# Patient Record
Sex: MALE | Race: BLACK OR AFRICAN AMERICAN | NOT HISPANIC OR LATINO | Employment: UNEMPLOYED | URBAN - METROPOLITAN AREA
[De-identification: names, ages, dates, MRNs, and addresses within clinical notes are randomized per-mention and may not be internally consistent; named-entity substitution may affect disease eponyms.]

---

## 2023-07-18 ENCOUNTER — HOSPITAL ENCOUNTER (EMERGENCY)
Facility: CLINIC | Age: 33
Discharge: HOME | End: 2023-07-18
Attending: EMERGENCY MEDICINE | Admitting: EMERGENCY MEDICINE
Payer: COMMERCIAL

## 2023-07-18 VITALS
OXYGEN SATURATION: 98 % | TEMPERATURE: 97.8 F | DIASTOLIC BLOOD PRESSURE: 82 MMHG | RESPIRATION RATE: 16 BRPM | HEART RATE: 70 BPM | SYSTOLIC BLOOD PRESSURE: 127 MMHG

## 2023-07-18 DIAGNOSIS — K04.7 DENTAL INFECTION: ICD-10-CM

## 2023-07-18 PROCEDURE — 99284 EMERGENCY DEPT VISIT MOD MDM: CPT

## 2023-07-18 PROCEDURE — 99284 EMERGENCY DEPT VISIT MOD MDM: CPT | Performed by: EMERGENCY MEDICINE

## 2023-07-18 RX ORDER — PENICILLIN V POTASSIUM 500 MG/1
500 TABLET, FILM COATED ORAL 4 TIMES DAILY
Qty: 40 TABLET | Refills: 0 | Status: SHIPPED | OUTPATIENT
Start: 2023-07-18 | End: 2023-07-28

## 2023-07-18 RX ORDER — OXYCODONE HYDROCHLORIDE 5 MG/1
5 TABLET ORAL EVERY 6 HOURS PRN
Qty: 12 TABLET | Refills: 0 | Status: SHIPPED | OUTPATIENT
Start: 2023-07-18 | End: 2023-07-21

## 2023-07-18 ASSESSMENT — ACTIVITIES OF DAILY LIVING (ADL): ADLS_ACUITY_SCORE: 33

## 2023-07-18 NOTE — DISCHARGE INSTRUCTIONS
Many of these clinics offer a sliding fee option for patients that qualify, and see patients on a walk-in or same day basis. Please call each clinic directly. As services, hours, fees and policies vary greatly.      [place]:  [facility]     [phone]  [facility] ([facility]) [phone]  [facility]  [phone]  [facility]      [phone]   [facility]    [phone]  [facility]  [phone]  [facility] ([facility]) [phone]  [facility]     [phone]  [facility]   [phone]  [facility] (cash only)   [phone]  [facility]    [phone] (adults)          [phone] (children)    [place]:  [facility]     [phone]; [phone]  [facility]     [phone]  [facility]     [phone]  [facility] (free, limited)    [phone]    Multiple Locations:  [facility]       [phone]      Follow-up with a dentist as soon as possible for definitive treatment of the affected tooth.    Penicillin as directed.    Tylenol and or ibuprofen for pain.  Oxycodone, as directed, if needed for more severe pain.    Return to the emergency department for any problems.

## 2023-07-18 NOTE — ED TRIAGE NOTES
Triage Assessment     Row Name 07/18/23 7615       Triage Assessment (Adult)    Airway WDL WDL       Respiratory WDL    Respiratory WDL WDL       Skin Circulation/Temperature WDL    Skin Circulation/Temperature WDL WDL       Cardiac WDL    Cardiac WDL WDL       Peripheral/Neurovascular WDL    Peripheral Neurovascular WDL WDL       Cognitive/Neuro/Behavioral WDL    Cognitive/Neuro/Behavioral WDL WDL            R uper tooth pain

## 2023-07-18 NOTE — ED TRIAGE NOTES
Triage Assessment     Row Name 07/18/23 1736       Triage Assessment (Adult)    Airway WDL WDL       Respiratory WDL    Respiratory WDL WDL       Skin Circulation/Temperature WDL    Skin Circulation/Temperature WDL WDL       Cardiac WDL    Cardiac WDL WDL       Peripheral/Neurovascular WDL    Peripheral Neurovascular WDL WDL       Cognitive/Neuro/Behavioral WDL    Cognitive/Neuro/Behavioral WDL WDL            R uper tooth pain

## 2024-02-21 ENCOUNTER — TELEPHONE (OUTPATIENT)
Dept: EMERGENCY MEDICINE | Facility: CLINIC | Age: 34
End: 2024-02-21
Payer: COMMERCIAL

## 2024-02-21 ENCOUNTER — HOSPITAL ENCOUNTER (EMERGENCY)
Facility: CLINIC | Age: 34
Discharge: HOME | End: 2024-02-21
Attending: FAMILY MEDICINE | Admitting: FAMILY MEDICINE
Payer: COMMERCIAL

## 2024-02-21 VITALS
BODY MASS INDEX: 21.92 KG/M2 | TEMPERATURE: 98.3 F | DIASTOLIC BLOOD PRESSURE: 77 MMHG | WEIGHT: 148 LBS | HEART RATE: 80 BPM | OXYGEN SATURATION: 99 % | SYSTOLIC BLOOD PRESSURE: 120 MMHG | HEIGHT: 69 IN | RESPIRATION RATE: 20 BRPM

## 2024-02-21 DIAGNOSIS — R19.7 DIARRHEA OF PRESUMED INFECTIOUS ORIGIN: ICD-10-CM

## 2024-02-21 LAB
ADV 40+41 DNA STL QL NAA+NON-PROBE: NEGATIVE
ALBUMIN SERPL BCG-MCNC: 4.4 G/DL (ref 3.5–5.2)
ALP SERPL-CCNC: 106 U/L (ref 40–150)
ALT SERPL W P-5'-P-CCNC: 30 U/L (ref 0–70)
ANION GAP SERPL CALCULATED.3IONS-SCNC: 10 MMOL/L (ref 7–15)
AST SERPL W P-5'-P-CCNC: 31 U/L (ref 0–45)
ASTRO TYP 1-8 RNA STL QL NAA+NON-PROBE: NEGATIVE
BASOPHILS # BLD AUTO: 0 10E3/UL (ref 0–0.2)
BASOPHILS NFR BLD AUTO: 1 %
BILIRUB SERPL-MCNC: 0.7 MG/DL
BUN SERPL-MCNC: 14.2 MG/DL (ref 6–20)
C CAYETANENSIS DNA STL QL NAA+NON-PROBE: NEGATIVE
CALCIUM SERPL-MCNC: 8.9 MG/DL (ref 8.6–10)
CAMPYLOBACTER DNA SPEC NAA+PROBE: NEGATIVE
CHLORIDE SERPL-SCNC: 106 MMOL/L (ref 98–107)
CREAT SERPL-MCNC: 0.95 MG/DL (ref 0.67–1.17)
CRYPTOSP DNA STL QL NAA+NON-PROBE: NEGATIVE
DEPRECATED HCO3 PLAS-SCNC: 25 MMOL/L (ref 22–29)
E COLI O157 DNA STL QL NAA+NON-PROBE: ABNORMAL
E HISTOLYT DNA STL QL NAA+NON-PROBE: NEGATIVE
EAEC ASTA GENE ISLT QL NAA+PROBE: NEGATIVE
EC STX1+STX2 GENES STL QL NAA+NON-PROBE: NEGATIVE
EGFRCR SERPLBLD CKD-EPI 2021: >90 ML/MIN/1.73M2
EOSINOPHIL # BLD AUTO: 0.1 10E3/UL (ref 0–0.7)
EOSINOPHIL NFR BLD AUTO: 1 %
EPEC EAE GENE STL QL NAA+NON-PROBE: NEGATIVE
ERYTHROCYTE [DISTWIDTH] IN BLOOD BY AUTOMATED COUNT: 13.6 % (ref 10–15)
ETEC LTA+ST1A+ST1B TOX ST NAA+NON-PROBE: NEGATIVE
G LAMBLIA DNA STL QL NAA+NON-PROBE: NEGATIVE
GLUCOSE SERPL-MCNC: 101 MG/DL (ref 70–99)
HCT VFR BLD AUTO: 41.8 % (ref 40–53)
HGB BLD-MCNC: 13.6 G/DL (ref 13.3–17.7)
IMM GRANULOCYTES # BLD: 0 10E3/UL
IMM GRANULOCYTES NFR BLD: 0 %
LACTATE SERPL-SCNC: 0.6 MMOL/L (ref 0.7–2)
LIPASE SERPL-CCNC: 15 U/L (ref 13–60)
LYMPHOCYTES # BLD AUTO: 1.8 10E3/UL (ref 0.8–5.3)
LYMPHOCYTES NFR BLD AUTO: 28 %
MCH RBC QN AUTO: 28.7 PG (ref 26.5–33)
MCHC RBC AUTO-ENTMCNC: 32.5 G/DL (ref 31.5–36.5)
MCV RBC AUTO: 88 FL (ref 78–100)
MONOCYTES # BLD AUTO: 0.6 10E3/UL (ref 0–1.3)
MONOCYTES NFR BLD AUTO: 10 %
NEUTROPHILS # BLD AUTO: 3.8 10E3/UL (ref 1.6–8.3)
NEUTROPHILS NFR BLD AUTO: 60 %
NOROVIRUS GI+II RNA STL QL NAA+NON-PROBE: NEGATIVE
NRBC # BLD AUTO: 0 10E3/UL
NRBC BLD AUTO-RTO: 0 /100
P SHIGELLOIDES DNA STL QL NAA+NON-PROBE: NEGATIVE
PLATELET # BLD AUTO: 271 10E3/UL (ref 150–450)
POTASSIUM SERPL-SCNC: 3.6 MMOL/L (ref 3.4–5.3)
PROT SERPL-MCNC: 7.6 G/DL (ref 6.4–8.3)
RBC # BLD AUTO: 4.74 10E6/UL (ref 4.4–5.9)
RVA RNA STL QL NAA+NON-PROBE: NEGATIVE
SALMONELLA SP RPOD STL QL NAA+PROBE: NEGATIVE
SAPO I+II+IV+V RNA STL QL NAA+NON-PROBE: POSITIVE
SHIGELLA SP+EIEC IPAH ST NAA+NON-PROBE: NEGATIVE
SODIUM SERPL-SCNC: 141 MMOL/L (ref 135–145)
V CHOLERAE DNA SPEC QL NAA+PROBE: NEGATIVE
VIBRIO DNA SPEC NAA+PROBE: NEGATIVE
WBC # BLD AUTO: 6.4 10E3/UL (ref 4–11)
Y ENTEROCOL DNA STL QL NAA+PROBE: NEGATIVE

## 2024-02-21 PROCEDURE — 83690 ASSAY OF LIPASE: CPT | Performed by: FAMILY MEDICINE

## 2024-02-21 PROCEDURE — 36415 COLL VENOUS BLD VENIPUNCTURE: CPT | Performed by: FAMILY MEDICINE

## 2024-02-21 PROCEDURE — 99284 EMERGENCY DEPT VISIT MOD MDM: CPT | Mod: 25 | Performed by: FAMILY MEDICINE

## 2024-02-21 PROCEDURE — 80053 COMPREHEN METABOLIC PANEL: CPT | Performed by: FAMILY MEDICINE

## 2024-02-21 PROCEDURE — 96375 TX/PRO/DX INJ NEW DRUG ADDON: CPT | Performed by: FAMILY MEDICINE

## 2024-02-21 PROCEDURE — 83605 ASSAY OF LACTIC ACID: CPT | Performed by: FAMILY MEDICINE

## 2024-02-21 PROCEDURE — 258N000003 HC RX IP 258 OP 636: Performed by: FAMILY MEDICINE

## 2024-02-21 PROCEDURE — 85025 COMPLETE CBC W/AUTO DIFF WBC: CPT | Performed by: FAMILY MEDICINE

## 2024-02-21 PROCEDURE — 250N000011 HC RX IP 250 OP 636: Performed by: FAMILY MEDICINE

## 2024-02-21 PROCEDURE — 96361 HYDRATE IV INFUSION ADD-ON: CPT | Performed by: FAMILY MEDICINE

## 2024-02-21 PROCEDURE — 87507 IADNA-DNA/RNA PROBE TQ 12-25: CPT | Performed by: FAMILY MEDICINE

## 2024-02-21 PROCEDURE — 99284 EMERGENCY DEPT VISIT MOD MDM: CPT | Performed by: FAMILY MEDICINE

## 2024-02-21 PROCEDURE — 96374 THER/PROPH/DIAG INJ IV PUSH: CPT | Performed by: FAMILY MEDICINE

## 2024-02-21 RX ORDER — HYDROMORPHONE HCL IN WATER/PF 6 MG/30 ML
0.2 PATIENT CONTROLLED ANALGESIA SYRINGE INTRAVENOUS ONCE
Status: COMPLETED | OUTPATIENT
Start: 2024-02-21 | End: 2024-02-21

## 2024-02-21 RX ORDER — LOPERAMIDE HYDROCHLORIDE 2 MG/1
2 TABLET ORAL 4 TIMES DAILY PRN
Qty: 15 TABLET | Refills: 0 | Status: SHIPPED | OUTPATIENT
Start: 2024-02-21

## 2024-02-21 RX ORDER — KETOROLAC TROMETHAMINE 15 MG/ML
15 INJECTION, SOLUTION INTRAMUSCULAR; INTRAVENOUS ONCE
Status: COMPLETED | OUTPATIENT
Start: 2024-02-21 | End: 2024-02-21

## 2024-02-21 RX ADMIN — SODIUM CHLORIDE 1000 ML: 9 INJECTION, SOLUTION INTRAVENOUS at 08:10

## 2024-02-21 RX ADMIN — KETOROLAC TROMETHAMINE 15 MG: 15 INJECTION, SOLUTION INTRAMUSCULAR; INTRAVENOUS at 08:10

## 2024-02-21 RX ADMIN — HYDROMORPHONE HYDROCHLORIDE 0.2 MG: 0.2 INJECTION, SOLUTION INTRAMUSCULAR; INTRAVENOUS; SUBCUTANEOUS at 08:11

## 2024-02-21 ASSESSMENT — COLUMBIA-SUICIDE SEVERITY RATING SCALE - C-SSRS
6. HAVE YOU EVER DONE ANYTHING, STARTED TO DO ANYTHING, OR PREPARED TO DO ANYTHING TO END YOUR LIFE?: NO
2. HAVE YOU ACTUALLY HAD ANY THOUGHTS OF KILLING YOURSELF IN THE PAST MONTH?: NO
1. IN THE PAST MONTH, HAVE YOU WISHED YOU WERE DEAD OR WISHED YOU COULD GO TO SLEEP AND NOT WAKE UP?: NO

## 2024-02-21 ASSESSMENT — ACTIVITIES OF DAILY LIVING (ADL): ADLS_ACUITY_SCORE: 35

## 2024-02-21 NOTE — ED PROVIDER NOTES
ED Provider Note  [facility]      History     Chief Complaint   Patient presents with    Nausea, Vomiting, & Diarrhea    Abdominal Pain      Abdominal pain 8/10 Nausea, vomiting and diarrhea x 2 days.able to drink fluids     HPI  Rolando Alexandra is a 34 year old male who presents with diffuse crampy abdominal pain and diarrhea.  Symptoms started 2 days ago.  He has a 12-month-old who is also having multiple watery diarrheal stools.  His stools are described as watery, foul-smelling, nonbloody.  He has 6 significant cramping which resolves slightly after the diarrhea but persistent uncomfortable feeling throughout his abdomen.  Has been nauseated but no vomiting.  States he is still able to take some fluids.  No recent antibiotics.  No recent travel.  No prior gastrointestinal issues.  No fever or chills, no respiratory symptoms.  Last night he could not sleep because he was up to the bathroom every 20 minutes and feeling very uncomfortable.  He is feeling tired and weak this morning.    Past Medical History  No past medical history on file.  No past surgical history on file.  loperamide (IMODIUM A-D) 2 MG tablet  OMEPRAZOLE PO  PARoxetine (PAXIL) 20 MG tablet      No Known Allergies  Family History  Family History   Problem Relation Age of Onset    Diabetes No family hx of     Coronary Artery Disease No family hx of     Hypertension No family hx of     Cerebrovascular Disease No family hx of     Breast Cancer No family hx of     Colon Cancer No family hx of     Prostate Cancer No family hx of     Other Cancer No family hx of      Social History   Social History     Tobacco Use    Smoking status: Never   Substance Use Topics    Alcohol use: No    Drug use: No         A medically appropriate review of systems was performed with pertinent positives and negatives noted in the HPI, and all other systems negative.    Physical Exam   BP: 120/77  Pulse: 87  Temp: 98.4  F (36.9  C)  Resp:  "18  Height: 175.3 cm (5' 9\")  Weight: 67.1 kg (148 lb)  SpO2: 98 %  Physical Exam  Vitals and nursing note reviewed.   Constitutional:       General: He is not in acute distress.     Appearance: Normal appearance. He is not toxic-appearing.   HENT:      Head: Atraumatic.      Comments: Mucous membranes are dry  Eyes:      General: No scleral icterus.     Conjunctiva/sclera: Conjunctivae normal.   Cardiovascular:      Rate and Rhythm: Normal rate.      Heart sounds: Normal heart sounds.   Pulmonary:      Effort: Pulmonary effort is normal. No respiratory distress.      Breath sounds: Normal breath sounds.   Abdominal:      Palpations: Abdomen is soft.      Tenderness: There is generalized abdominal tenderness. There is no guarding or rebound.   Musculoskeletal:         General: No deformity.      Cervical back: Neck supple.   Skin:     General: Skin is warm.   Neurological:      General: No focal deficit present.      Mental Status: He is alert and oriented to person, place, and time.           ED Course, Procedures, & Data      Procedures                      Results for orders placed or performed during the hospital encounter of 02/21/24   Comprehensive metabolic panel     Status: Abnormal   Result Value Ref Range    Sodium 141 135 - 145 mmol/L    Potassium 3.6 3.4 - 5.3 mmol/L    Carbon Dioxide (CO2) 25 22 - 29 mmol/L    Anion Gap 10 7 - 15 mmol/L    Urea Nitrogen 14.2 6.0 - 20.0 mg/dL    Creatinine 0.95 0.67 - 1.17 mg/dL    GFR Estimate >90 >60 mL/min/1.73m2    Calcium 8.9 8.6 - 10.0 mg/dL    Chloride 106 98 - 107 mmol/L    Glucose 101 (H) 70 - 99 mg/dL    Alkaline Phosphatase 106 40 - 150 U/L    AST 31 0 - 45 U/L    ALT 30 0 - 70 U/L    Protein Total 7.6 6.4 - 8.3 g/dL    Albumin 4.4 3.5 - 5.2 g/dL    Bilirubin Total 0.7 <=1.2 mg/dL   Lipase     Status: Normal   Result Value Ref Range    Lipase 15 13 - 60 U/L   Lactic acid whole blood     Status: Abnormal   Result Value Ref Range    Lactic Acid 0.6 (L) 0.7 - 2.0 " mmol/L   CBC with platelets and differential     Status: None   Result Value Ref Range    WBC Count 6.4 4.0 - 11.0 10e3/uL    RBC Count 4.74 4.40 - 5.90 10e6/uL    Hemoglobin 13.6 13.3 - 17.7 g/dL    Hematocrit 41.8 40.0 - 53.0 %    MCV 88 78 - 100 fL    MCH 28.7 26.5 - 33.0 pg    MCHC 32.5 31.5 - 36.5 g/dL    RDW 13.6 10.0 - 15.0 %    Platelet Count 271 150 - 450 10e3/uL    % Neutrophils 60 %    % Lymphocytes 28 %    % Monocytes 10 %    % Eosinophils 1 %    % Basophils 1 %    % Immature Granulocytes 0 %    NRBCs per 100 WBC 0 <1 /100    Absolute Neutrophils 3.8 1.6 - 8.3 10e3/uL    Absolute Lymphocytes 1.8 0.8 - 5.3 10e3/uL    Absolute Monocytes 0.6 0.0 - 1.3 10e3/uL    Absolute Eosinophils 0.1 0.0 - 0.7 10e3/uL    Absolute Basophils 0.0 0.0 - 0.2 10e3/uL    Absolute Immature Granulocytes 0.0 <=0.4 10e3/uL    Absolute NRBCs 0.0 10e3/uL   CBC with platelets differential     Status: None    Narrative    The following orders were created for panel order CBC with platelets differential.  Procedure                               Abnormality         Status                     ---------                               -----------         ------                     CBC with platelets and d...[[id number]]                      Final result                 Please view results for these tests on the individual orders.     Medications   sodium chloride 0.9% BOLUS 1,000 mL (1,000 mLs Intravenous $New Bag 2/21/24 0810)   HYDROmorphone (DILAUDID) injection 0.2 mg (0.2 mg Intravenous $Given 2/21/24 0811)   ketorolac (TORADOL) injection 15 mg (15 mg Intravenous $Given 2/21/24 0810)     Labs Ordered and Resulted from Time of ED Arrival to Time of ED Departure   COMPREHENSIVE METABOLIC PANEL - Abnormal       Result Value    Sodium 141      Potassium 3.6      Carbon Dioxide (CO2) 25      Anion Gap 10      Urea Nitrogen 14.2      Creatinine 0.95      GFR Estimate >90      Calcium 8.9      Chloride 106      Glucose 101 (*)     Alkaline  Phosphatase 106      AST 31      ALT 30      Protein Total 7.6      Albumin 4.4      Bilirubin Total 0.7     LACTIC ACID WHOLE BLOOD - Abnormal    Lactic Acid 0.6 (*)    LIPASE - Normal    Lipase 15     CBC WITH PLATELETS AND DIFFERENTIAL    WBC Count 6.4      RBC Count 4.74      Hemoglobin 13.6      Hematocrit 41.8      MCV 88      MCH 28.7      MCHC 32.5      RDW 13.6      Platelet Count 271      % Neutrophils 60      % Lymphocytes 28      % Monocytes 10      % Eosinophils 1      % Basophils 1      % Immature Granulocytes 0      NRBCs per 100 WBC 0      Absolute Neutrophils 3.8      Absolute Lymphocytes 1.8      Absolute Monocytes 0.6      Absolute Eosinophils 0.1      Absolute Basophils 0.0      Absolute Immature Granulocytes 0.0      Absolute NRBCs 0.0     ENTERIC BACTERIA AND VIRUS PANEL BY PCR     No orders to display          Critical care was not performed.     Medical Decision Making  The patient's presentation was of moderate complexity (an acute illness with systemic symptoms).    The patient's evaluation involved:  ordering and/or review of 3+ test(s) in this encounter (see separate area of note for details)    The patient's management necessitated moderate risk (prescription drug management including medications given in the ED).    Assessment & Plan    Previously healthy 34-year-old male with a 2-day history of copious multiple episodes of watery diarrhea associated with generalized abdominal discomfort and cramping.  Differential diagnosis includes infectious diarrhea due to viral cause (such as norovirus rotavirus, adenovirus, etc.), infectious diarrhea due to bacterial cause (E. coli, Shigella, Campylobacter, Salmonella, C. Difficile), protozoal disease such as Giardia or other, inflammatory bowel disease, noninfectious toxin mediated illness, irritable bowel syndrome, less likely intestinal ischemia, appendicitis, diverticulitis.  On exam his vital signs are unremarkable.  He is tired appearing but  otherwise in no distress.  Mental status normal.  Neck supple.  Mucous membranes slightly dry.  Cardiovascular exam normal.  He has generalized abdominal tenderness without guarding rebound or peritoneal signs.  No joint effusions or skin rashes, the remainder of his exam is normal.  Was treated with IV fluids and analgesics, labs and stool for enteric pathogens ordered.  Patient is feeling much better.  Diarrhea has slowed considerably, however the sample was sent for enteric pathogens.  He would like to be discharged.  His serial abdominal exam is benign as he is taking p.o. without difficulty.  Plan to treat symptomatically for diarrhea given that his stool has been sent.  He [facility] in the next 12 to 24 hours.  We discussed the indications for emergency department return and follow-up.  Stable for discharge.      I have reviewed the nursing notes. I have reviewed the findings, diagnosis, plan and need for follow up with the patient.    New Prescriptions    LOPERAMIDE (IMODIUM A-D) 2 MG TABLET    Take 1 tablet (2 mg) by mouth 4 times daily as needed for diarrhea       Final diagnoses:   Diarrhea of presumed infectious origin       Phil Varela MD  [facility] EMERGENCY DEPARTMENT  2/21/2024     Phil Varela MD  02/21/24 1011

## 2024-02-21 NOTE — LETTER
February 21, 2024        Rolando Alexandra  [address] [place] [address]          Dear Rolando Alexandra:    You were seen in the [facility] Emergency Department at [facility] EMERGENCY DEPARTMENT on 2/21/2024.  We are unable to reach you by phone, so we are sending you this letter.     It is important that you call [facility] Emergency Department lab result nurse at [phone], as we have information to relay to you AND/OR we MAY have to make some changes in your treatment.    Best time to call back is between 9AM and 5:30PM, 7 days a week.      Sincerely,     [facility] Emergency Department Lab Result RN  [phone]

## 2024-02-21 NOTE — ED NOTES
IVETTE denise and reviewed with the patient. Questions answered and IV removed prior to discharge.

## 2024-02-21 NOTE — TELEPHONE ENCOUNTER
[facility] ([facility])    Reason for call: Lab Result Notification     Lab Result (including Rx patient on, if applicable).  If culture, copy of lab report at bottom.  Lab Result: Final Enteric Bacteria and Virus Panel by KEO Stool is POSITIVE for Sapovirus  Recommendations in treatment per [facility] Lab Result Enteric Bacteria and Virus Panel protocol.     Creatinine Level (mg/dl)   Creatinine   Date Value Ref Range Status   02/21/2024 0.95 0.67 - 1.17 mg/dL Final    Creatinine clearance (ml/min), if applicable    Serum creatinine: 0.95 mg/dL 02/21/24 0807  Estimated creatinine clearance: 102 mL/min     Left voicemail message requesting a call back to [facility] ED Lab Result RN at [phone]. RN is available every day between 9 a.m. and 5:30 p.m. Letter sent.     Patient called back:     Current symptoms: Pt states he is doing well  Diarrhea: denies  Fever: denies  Vomiting: denies  Weakness/dizziness: denies    Notified of result, education given on sapovirus, encouraged hydration, infection control, follow up with PCP/ED as necessary.     RN Recommendations/Instructions per [facility] ED lab result protocol:   [facility] ED lab result protocol utilized: Enteric     Component      Latest Ref Rng 2/21/2024  10:15 AM   Sapovirus      Negative  Positive !       Legend:  ! Abnormal    Sarah Chaudharin, RN

## 2024-02-21 NOTE — ED TRIAGE NOTES
Abdominal pain 8/10 Nausea, vomiting and diarrhea x 2 days.able to drink fluids     Triage Assessment (Adult)       Row Name 02/21/24 0717          Triage Assessment    Airway WDL WDL        Respiratory WDL    Respiratory WDL WDL        Skin Circulation/Temperature WDL    Skin Circulation/Temperature WDL WDL        Cardiac WDL    Cardiac WDL WDL        Peripheral/Neurovascular WDL    Peripheral Neurovascular WDL WDL        Cognitive/Neuro/Behavioral WDL    Cognitive/Neuro/Behavioral WDL WDL

## 2024-02-21 NOTE — DISCHARGE INSTRUCTIONS
Thank you for choosing [facility].     Please closely monitor for further symptoms. Return to the Emergency Department if you develop any new or worsening signs or symptoms.    If you received any opiate pain medications or sedatives during your visit, please do not drive for at least 8 hours.     Labs, cultures or final xray interpretations may still need to be reviewed.  We will call you if your plan of care needs to be changed.    Please follow up with your primary care physician or clinic if you have any persistent symptoms.  Stool for enteric pathogens was sent prior to your discharge, results should be available sometime this evening or tomorrow, can follow-up to your primary care clinic or your MyChart.

## 2024-03-29 ENCOUNTER — HOSPITAL ENCOUNTER (EMERGENCY)
Facility: CLINIC | Age: 34
Discharge: HOME | End: 2024-03-29
Attending: FAMILY MEDICINE | Admitting: FAMILY MEDICINE
Payer: COMMERCIAL

## 2024-03-29 VITALS
RESPIRATION RATE: 16 BRPM | HEART RATE: 77 BPM | DIASTOLIC BLOOD PRESSURE: 80 MMHG | SYSTOLIC BLOOD PRESSURE: 119 MMHG | OXYGEN SATURATION: 99 % | TEMPERATURE: 98.7 F

## 2024-03-29 DIAGNOSIS — H10.9 CONJUNCTIVITIS OF RIGHT EYE, UNSPECIFIED CONJUNCTIVITIS TYPE: ICD-10-CM

## 2024-03-29 PROCEDURE — 99284 EMERGENCY DEPT VISIT MOD MDM: CPT | Performed by: FAMILY MEDICINE

## 2024-03-29 PROCEDURE — 99283 EMERGENCY DEPT VISIT LOW MDM: CPT | Performed by: FAMILY MEDICINE

## 2024-03-29 RX ORDER — PROPARACAINE HYDROCHLORIDE 5 MG/ML
1 SOLUTION/ DROPS OPHTHALMIC ONCE
Status: DISCONTINUED | OUTPATIENT
Start: 2024-03-29 | End: 2024-03-29 | Stop reason: HOSPADM

## 2024-03-29 RX ORDER — IBUPROFEN 200 MG
600 TABLET ORAL EVERY 6 HOURS PRN
Qty: 30 TABLET | Refills: 0 | Status: SHIPPED | OUTPATIENT
Start: 2024-03-29

## 2024-03-29 RX ORDER — POLYMYXIN B SULFATE AND TRIMETHOPRIM 1; 10000 MG/ML; [USP'U]/ML
1-2 SOLUTION OPHTHALMIC EVERY 4 HOURS
Qty: 10 ML | Refills: 0 | Status: SHIPPED | OUTPATIENT
Start: 2024-03-29 | End: 2024-04-03

## 2024-03-29 ASSESSMENT — COLUMBIA-SUICIDE SEVERITY RATING SCALE - C-SSRS
1. IN THE PAST MONTH, HAVE YOU WISHED YOU WERE DEAD OR WISHED YOU COULD GO TO SLEEP AND NOT WAKE UP?: NO
2. HAVE YOU ACTUALLY HAD ANY THOUGHTS OF KILLING YOURSELF IN THE PAST MONTH?: NO
6. HAVE YOU EVER DONE ANYTHING, STARTED TO DO ANYTHING, OR PREPARED TO DO ANYTHING TO END YOUR LIFE?: NO

## 2024-03-29 ASSESSMENT — VISUAL ACUITY
OU: 1
OD: 20/30;WITHOUT CORRECTIVE LENSES
OS: 20/20;WITHOUT CORRECTIVE LENSES

## 2024-03-29 ASSESSMENT — TONOMETRY: OD_IOP_MMHG: 17

## 2024-03-29 NOTE — ED TRIAGE NOTES
Patient arrives with redness and swelling to RIGHT eye x 2 days. Tearing, painful. Denies any exudate aside from tears. States vision is clear. Sclera red.     Triage Assessment (Adult)       Row Name 03/29/24 2961          Triage Assessment    Airway WDL WDL        Respiratory WDL    Respiratory WDL WDL        Skin Circulation/Temperature WDL    Skin Circulation/Temperature WDL WDL        Cardiac WDL    Cardiac WDL WDL        Peripheral/Neurovascular WDL    Peripheral Neurovascular WDL WDL        Cognitive/Neuro/Behavioral WDL    Cognitive/Neuro/Behavioral WDL WDL

## 2024-03-29 NOTE — ED PROVIDER NOTES
ED Provider Note  [facility]      History     Chief Complaint   Patient presents with    Eye Problem     HPI  Rolando Alexandra is a 34 year old male with no PMHx presents to the emergency department for right eye swelling and redness X 2 days.  States he was playing with his daughter, is not certain if he might have been scratched but does not recall exact incident.  No significant trauma reported.  His eye became more red with slight light sensitivity.  Denies any visual disturbance.  Has had some watery discharge.  Does not wear contacts or corrective lenses.  Has no history of ocular problems.  He states yesterday he had bodyaches which have since resolved, currently no headache, runny nose, sore throat, cough or other systemic symptoms.    Past Medical History  No past medical history on file.  No past surgical history on file.  ibuprofen (ADVIL/MOTRIN) 200 MG tablet  polymixin b-trimethoprim (POLYTRIM) 15726-6.1 UNIT/ML-% ophthalmic solution  loperamide (IMODIUM A-D) 2 MG tablet  OMEPRAZOLE PO  PARoxetine (PAXIL) 20 MG tablet      No Known Allergies  Family History  Family History   Problem Relation Age of Onset    Diabetes No family hx of     Coronary Artery Disease No family hx of     Hypertension No family hx of     Cerebrovascular Disease No family hx of     Breast Cancer No family hx of     Colon Cancer No family hx of     Prostate Cancer No family hx of     Other Cancer No family hx of      Social History   Social History     Tobacco Use    Smoking status: Never   Substance Use Topics    Alcohol use: No    Drug use: No         A medically appropriate review of systems was performed with pertinent positives and negatives noted in the HPI, and all other systems negative.    Physical Exam   BP: 124/80  Pulse: 81  Temp: 98.7  F (37.1  C)  Resp: 18  SpO2: 98 %  Physical Exam  Vitals and nursing note reviewed.   Constitutional:       Appearance: Normal appearance.   HENT:      Head:  Normocephalic and atraumatic.      Nose: Nose normal.   Eyes:      General: Lids are normal. Lids are everted, no foreign bodies appreciated. Vision grossly intact.         Right eye: No foreign body or discharge.      Intraocular pressure: Right eye pressure is 17 mmHg.      Extraocular Movements: Extraocular movements intact.      Right eye: Normal extraocular motion.      Left eye: Normal extraocular motion.      Conjunctiva/sclera:      Right eye: Right conjunctiva is injected. No exudate.     Pupils: Pupils are equal, round, and reactive to light.      Right eye: No corneal abrasion or fluorescein uptake.      Slit lamp exam:     Right eye: Anterior chamber quiet. No corneal ulcer, foreign body, hyphema or hypopyon.      Comments: Visual acuity 20/20 in the left and 20/30 on the right.  Patient tells me he feels his vision is at its baseline.   Cardiovascular:      Rate and Rhythm: Normal rate and regular rhythm.   Pulmonary:      Effort: Pulmonary effort is normal.   Musculoskeletal:         General: Normal range of motion.      Cervical back: Normal range of motion and neck supple.   Skin:     General: Skin is warm and dry.   Neurological:      General: No focal deficit present.      Mental Status: He is alert and oriented to person, place, and time.           ED Course, Procedures, & Data      Procedures               No results found for any visits on 03/29/24.  Medications   proparacaine (ALCAINE) 0.5 % ophthalmic solution 1 drop (has no administration in time range)   fluorescein (FUL-CRISTIANO) ophthalmic strip 1 strip (has no administration in time range)     Labs Ordered and Resulted from Time of ED Arrival to Time of ED Departure - No data to display  No orders to display          Critical care was not performed.     Medical Decision Making  The patient's presentation was of moderate complexity (an acute illness with systemic symptoms).    The patient's evaluation involved:  history and exam without other  MDM data elements    The patient's management necessitated moderate risk (prescription drug management including medications given in the ED).    Assessment & Plan    34-year-old male with no prior history of high difficulty presenting with 2 days of nontraumatic injection and watery discharge from the right eye.  Initial differential diagnosis includes dry eyes, acute viral conjunctivitis, bacterial conjunctivitis, allergic conjunctivitis, irritant conjunctivitis as well as more serious causes such as keratitis, iritis, angle closure glaucoma, foreign body, corneal abrasion.  This patient  has no red flags such as decreased visual acuity/ ciliary flush/ severe photophobia/ foreign body sensation/ corneal opacity/ pupillary fixation or asymmetry/ severe associated headache and nausea or other findings to suggest any of the more concerning conditions considered above.  Gregg-Pen IOP is normal.  Patient appears to have acute conjunctivitis likely viral, cannot rule out bacterial.  No dendrites.  Plan to treat with eyedrops and ibuprofen.  We discussed the indications for emergency department return and follow-up.  Stable for discharge.        I have reviewed the nursing notes. I have reviewed the findings, diagnosis, plan and need for follow up with the patient.    Discharge Medication List as of 3/29/2024  2:54 PM        START taking these medications    Details   ibuprofen (ADVIL/MOTRIN) 200 MG tablet Take 3 tablets (600 mg) by mouth every 6 hours as needed for other or fever (Body aches), Disp-30 tablet, R-0, E-Prescribe      polymixin b-trimethoprim (POLYTRIM) 38370-1.1 UNIT/ML-% ophthalmic solution Place 1-2 drops into the right eye every 4 hours for 5 days, Disp-10 mL, R-0, E-Prescribe             Final diagnoses:   Conjunctivitis of right eye, unspecified conjunctivitis type       Phil Varela MD    [facility] EMERGENCY DEPARTMENT  3/29/2024     Phil Varela MD  03/29/24 8303

## 2024-03-29 NOTE — DISCHARGE INSTRUCTIONS
Please make an appointment to follow up with [facility] Clinic (phone: [phone]) in 5-7 days unless symptoms completely resolve.

## 2025-04-21 ENCOUNTER — HOSPITAL ENCOUNTER (EMERGENCY)
Facility: CLINIC | Age: 35
Discharge: HOME | End: 2025-04-21
Attending: EMERGENCY MEDICINE | Admitting: EMERGENCY MEDICINE
Payer: COMMERCIAL

## 2025-04-21 VITALS
RESPIRATION RATE: 16 BRPM | HEART RATE: 82 BPM | BODY MASS INDEX: 21.52 KG/M2 | SYSTOLIC BLOOD PRESSURE: 102 MMHG | TEMPERATURE: 98.9 F | WEIGHT: 145.7 LBS | OXYGEN SATURATION: 98 % | DIASTOLIC BLOOD PRESSURE: 73 MMHG

## 2025-04-21 DIAGNOSIS — J10.1 INFLUENZA B: ICD-10-CM

## 2025-04-21 LAB
FLUAV RNA SPEC QL NAA+PROBE: NEGATIVE
FLUBV RNA RESP QL NAA+PROBE: POSITIVE
HOLD SPECIMEN: NORMAL
HOLD SPECIMEN: NORMAL
RSV RNA SPEC NAA+PROBE: NEGATIVE
SARS-COV-2 RNA RESP QL NAA+PROBE: NEGATIVE

## 2025-04-21 PROCEDURE — 99284 EMERGENCY DEPT VISIT MOD MDM: CPT | Performed by: EMERGENCY MEDICINE

## 2025-04-21 PROCEDURE — 258N000003 HC RX IP 258 OP 636: Performed by: EMERGENCY MEDICINE

## 2025-04-21 PROCEDURE — 96360 HYDRATION IV INFUSION INIT: CPT | Performed by: EMERGENCY MEDICINE

## 2025-04-21 PROCEDURE — 99283 EMERGENCY DEPT VISIT LOW MDM: CPT | Mod: 25 | Performed by: EMERGENCY MEDICINE

## 2025-04-21 PROCEDURE — 250N000013 HC RX MED GY IP 250 OP 250 PS 637: Performed by: EMERGENCY MEDICINE

## 2025-04-21 PROCEDURE — 87637 SARSCOV2&INF A&B&RSV AMP PRB: CPT | Performed by: EMERGENCY MEDICINE

## 2025-04-21 RX ORDER — CODEINE PHOSPHATE AND GUAIFENESIN 10; 100 MG/5ML; MG/5ML
10 SOLUTION ORAL ONCE
Status: COMPLETED | OUTPATIENT
Start: 2025-04-21 | End: 2025-04-21

## 2025-04-21 RX ORDER — ACETAMINOPHEN 500 MG
500-1000 TABLET ORAL EVERY 6 HOURS PRN
COMMUNITY

## 2025-04-21 RX ORDER — ACETAMINOPHEN 500 MG
1000 TABLET ORAL ONCE
Status: COMPLETED | OUTPATIENT
Start: 2025-04-21 | End: 2025-04-21

## 2025-04-21 RX ORDER — CODEINE PHOSPHATE AND GUAIFENESIN 10; 100 MG/5ML; MG/5ML
1-2 SOLUTION ORAL EVERY 6 HOURS PRN
Qty: 120 ML | Refills: 0 | Status: SHIPPED | OUTPATIENT
Start: 2025-04-21

## 2025-04-21 RX ADMIN — GUAIFENESIN AND CODEINE PHOSPHATE 10 ML: 100; 10 SOLUTION ORAL at 15:37

## 2025-04-21 RX ADMIN — ACETAMINOPHEN 1000 MG: 500 TABLET ORAL at 15:37

## 2025-04-21 RX ADMIN — SODIUM CHLORIDE 1000 ML: 9 INJECTION, SOLUTION INTRAVENOUS at 15:36

## 2025-04-21 ASSESSMENT — ACTIVITIES OF DAILY LIVING (ADL): ADLS_ACUITY_SCORE: 41

## 2025-04-21 ASSESSMENT — COLUMBIA-SUICIDE SEVERITY RATING SCALE - C-SSRS
6. HAVE YOU EVER DONE ANYTHING, STARTED TO DO ANYTHING, OR PREPARED TO DO ANYTHING TO END YOUR LIFE?: NO
1. IN THE PAST MONTH, HAVE YOU WISHED YOU WERE DEAD OR WISHED YOU COULD GO TO SLEEP AND NOT WAKE UP?: NO
2. HAVE YOU ACTUALLY HAD ANY THOUGHTS OF KILLING YOURSELF IN THE PAST MONTH?: NO

## 2025-04-21 NOTE — Clinical Note
Rolando Alexandra was seen and treated in our emergency department on 4/21/2025.  He may return to work on 04/24/2025.       If you have any questions or concerns, please don't hesitate to call.      Tim East MD

## 2025-04-21 NOTE — ED PROVIDER NOTES
ED PROVIDER NOTE  [facility]  [facility] [place] [place]      History     Chief Complaint   Patient presents with    Cough     With fever, chills for 4-5 days.     HPI  Rolando Alexandra is a 35 year old male who presents to the emergency department with 4 days worth of a cough, fevers and chills.  Patient states that he has bodyaches all over with diffuse myalgias and states he has been taking some Tylenol at home for his fever.  The patient denies any shortness of breath. He denies any vomiting or diarrhea and presents here to the ER for evaluation.      This part of the document was transcribed by Harjeet Rodriguez Medical Scribe.      I have reviewed the Medications, Allergies, Past Medical and Surgical History, and Social History in the Epic system.    No past medical history on file.    No past surgical history on file.        Dose / Directions   acetaminophen 500 MG tablet  Commonly known as: TYLENOL      Dose: 500-1,000 mg  Take 500-1,000 mg by mouth every 6 hours as needed for mild pain.  Refills: 0     ibuprofen 200 MG tablet  Commonly known as: ADVIL/MOTRIN      Dose: 600 mg  Take 3 tablets (600 mg) by mouth every 6 hours as needed for other or fever (Body aches)  Quantity: 30 tablet  Refills: 0     loperamide 2 MG tablet  Commonly known as: IMODIUM A-D      Dose: 2 mg  Take 1 tablet (2 mg) by mouth 4 times daily as needed for diarrhea  Quantity: 15 tablet  Refills: 0     OMEPRAZOLE PO      Refills: 0     PARoxetine 20 MG tablet  Commonly known as: PAXIL  Used for: Premature ejaculation      Dose: 20 mg  Take 1 tablet (20 mg) by mouth At Bedtime  Quantity: 90 tablet  Refills: 3         Past medical history, past surgical history, medications, and allergies were reviewed with the patient. Additional pertinent items: None    Family History   Problem Relation Age of Onset    Diabetes No family hx of     Coronary Artery Disease No family hx of     Hypertension No family hx of     Cerebrovascular Disease No  family hx of     Breast Cancer No family hx of     Colon Cancer No family hx of     Prostate Cancer No family hx of     Other Cancer No family hx of        Social History     Tobacco Use    Smoking status: Never    Smokeless tobacco: Not on file   Substance Use Topics    Alcohol use: No     Social history was reviewed with the patient. Additional pertinent items: None    No Known Allergies    Review of Systems  A complete review of systems was performed with pertinent positives and negatives noted in the HPI, and all other systems negative.    Physical Exam   BP: 105/74  Pulse: 96  Temp: 100.1  F (37.8  C)  Resp: 18  Weight: 66.1 kg (145 lb 11.2 oz)  SpO2: 98 %      Physical Exam  Vitals and nursing note reviewed.   HENT:      Head: Atraumatic.   Eyes:      Extraocular Movements: Extraocular movements intact.      Pupils: Pupils are equal, round, and reactive to light.   Cardiovascular:      Rate and Rhythm: Regular rhythm.      Heart sounds: Normal heart sounds.   Pulmonary:      Breath sounds: Normal breath sounds.   Abdominal:      Palpations: Abdomen is soft.   Musculoskeletal:         General: No swelling or deformity.      Cervical back: Neck supple.   Lymphadenopathy:      Cervical: No cervical adenopathy.   Neurological:      General: No focal deficit present.      Mental Status: He is alert and oriented to person, place, and time.   Psychiatric:         Mood and Affect: Mood normal.         ED Course     Orders Placed This Encounter   Procedures    Influenza A/B, RSV and SARS-CoV2 PCR (COVID-19)    [place] Draw    Extra Green Top (Lithium Heparin) Tube    Extra Purple Top Tube    Peripheral IV catheter    Catheter Site Care    Droplet Isolation     Medications   sodium chloride (PF) 0.9% PF flush 3 mL (3 mLs Intracatheter $Given 4/21/25 9439)   sodium chloride (PF) 0.9% PF flush 3 mL (has no administration in time range)   acetaminophen (TYLENOL) tablet 1,000 mg (1,000 mg Oral $Given 4/21/25 1537)    guaiFENesin-codeine (ROBITUSSIN AC) 100-10 MG/5ML solution 10 mL (10 mLs Oral $Given 4/21/25 5697)   sodium chloride 0.9% BOLUS 1,000 mL (0 mLs Intravenous Stopped 4/21/25 1627)          Procedures         Results for orders placed or performed during the hospital encounter of 04/21/25 (from the past 24 hours)   Influenza A/B, RSV and SARS-CoV2 PCR (COVID-19) Nasopharyngeal    Specimen: Nasopharyngeal; Swab   Result Value Ref Range    Influenza A PCR Negative Negative    Influenza B PCR Positive (A) Negative    RSV PCR Negative Negative    SARS CoV2 PCR Negative Negative    Narrative    Testing was performed using the Xpert Xpress CoV2/Flu/RSV Assay on the Cepheid GeneXpert Instrument. This test should be ordered for the detection of SARS-CoV2, influenza, and RSV viruses in individuals with signs and symptoms of respiratory tract infection. This test is for in vitro diagnostic use under the [place] FDA for laboratories certified under CLIA to perform high or moderate complexity testing. This test has been [place] FDA cleared. A negative result does not rule out the presence of PCR inhibitors in the specimen or target RNA in concentration below the limit of detection for the assay. If only one viral target is positive but coinfection with multiple targets is suspected, the sample should be re-tested with another FDA cleared, approved, or authorized test, if coninfection would change clinical management. This test was validated by the [facility]. These laboratories are certified under the Clinical Laboratory Improvement Amendments of 1988 (CLIA-88) as qualified to perfom high complexity laboratory testing.   [place] Draw    Narrative    The following orders were created for panel order [place] Draw.  Procedure                               Abnormality         Status                     ---------                               -----------         ------                     Extra Green Top  (Lithiu...[[id number]]                                                 Extra Purple Top Tube[[id number]]                                                        Please view results for these tests on the individual orders.       Critical care was not performed.     Medical Decision Making  The patient's presentation was of moderate complexity (an acute illness with systemic symptoms).    The patient's evaluation involved:  ordering and/or review of 3+ test(s) in this encounter (see above)    The patient's management necessitated moderate risk (prescription drug management including medications given in the ED).      Assessments & Plan (with Medical Decision Making)     I have reviewed the nursing notes.    Patient was given the above medications with IV fluids and did have some relief in his symptoms.  At this time you will be sent home.  The patient is outside the window for treatment with Tamiflu so conservative management would be followed along with a work note.    I have reviewed the findings, diagnosis, plan and need for follow up with the patient.      Current Discharge Medication List        START taking these medications    Details   guaiFENesin-codeine (ROBITUSSIN AC) 100-10 MG/5ML solution Take 5-10 mLs by mouth every 6 hours as needed for cough.  Qty: 120 mL, Refills: 0             Final diagnoses:   Influenza B     Home to rest    Fill your prescription and take as directed    Please make an appointment to follow up with Your Primary Care Provider in 7-10 days if not improving.    Routine discharge instructions were given for this diagnosis    KIRAN VELAZQUEZ MD    4/21/2025   [facility] EMERGENCY DEPARTMENT       Kiran Velazquez MD  04/21/25 8509     Statement Selected

## 2025-04-21 NOTE — ED TRIAGE NOTES
Triage Assessment (Adult)       Row Name 04/21/25 1438          Triage Assessment    Airway WDL WDL        Respiratory WDL    Respiratory WDL X;cough     Cough Frequency infrequent        Skin Circulation/Temperature WDL    Skin Circulation/Temperature WDL WDL        Cardiac WDL    Cardiac WDL WDL        Peripheral/Neurovascular WDL    Peripheral Neurovascular WDL WDL        Cognitive/Neuro/Behavioral WDL    Cognitive/Neuro/Behavioral WDL WDL

## 2025-04-21 NOTE — DISCHARGE INSTRUCTIONS
Home to rest    Fill your prescription and take as directed    Please make an appointment to follow up with Your Primary Care Provider in 7-10 days if not improving.